# Patient Record
Sex: MALE | URBAN - METROPOLITAN AREA
[De-identification: names, ages, dates, MRNs, and addresses within clinical notes are randomized per-mention and may not be internally consistent; named-entity substitution may affect disease eponyms.]

---

## 2023-06-22 ENCOUNTER — OFFICE VISIT (OUTPATIENT)
Dept: PRIMARY CARE | Facility: CLINIC | Age: 58
End: 2023-06-22
Payer: COMMERCIAL

## 2023-06-22 VITALS
HEART RATE: 66 BPM | DIASTOLIC BLOOD PRESSURE: 72 MMHG | BODY MASS INDEX: 26.44 KG/M2 | HEIGHT: 74 IN | WEIGHT: 206 LBS | SYSTOLIC BLOOD PRESSURE: 118 MMHG | TEMPERATURE: 97.1 F | RESPIRATION RATE: 18 BRPM

## 2023-06-22 DIAGNOSIS — E78.2 MIXED HYPERLIPIDEMIA: Primary | ICD-10-CM

## 2023-06-22 DIAGNOSIS — E11.9 TYPE 2 DIABETES MELLITUS WITHOUT COMPLICATION, UNSPECIFIED WHETHER LONG TERM INSULIN USE (MULTI): ICD-10-CM

## 2023-06-22 DIAGNOSIS — E55.9 VITAMIN D DEFICIENCY: ICD-10-CM

## 2023-06-22 PROBLEM — E78.5 HYPERLIPIDEMIA: Status: ACTIVE | Noted: 2023-06-22

## 2023-06-22 PROBLEM — K64.8 INTERNAL HEMORRHOIDS: Status: ACTIVE | Noted: 2023-06-22

## 2023-06-22 PROCEDURE — 3078F DIAST BP <80 MM HG: CPT | Performed by: FAMILY MEDICINE

## 2023-06-22 PROCEDURE — 3074F SYST BP LT 130 MM HG: CPT | Performed by: FAMILY MEDICINE

## 2023-06-22 PROCEDURE — 1036F TOBACCO NON-USER: CPT | Performed by: FAMILY MEDICINE

## 2023-06-22 PROCEDURE — 99214 OFFICE O/P EST MOD 30 MIN: CPT | Performed by: FAMILY MEDICINE

## 2023-06-22 RX ORDER — ERGOCALCIFEROL 1.25 MG/1
1 CAPSULE ORAL
COMMUNITY
Start: 2019-08-21 | End: 2023-06-22 | Stop reason: SDUPTHER

## 2023-06-22 RX ORDER — LINAGLIPTIN AND METFORMIN HYDROCHLORIDE 2.5; 1 MG/1; MG/1
1 TABLET, FILM COATED ORAL 2 TIMES DAILY
COMMUNITY
End: 2023-06-22 | Stop reason: SDUPTHER

## 2023-06-22 RX ORDER — PRAVASTATIN SODIUM 40 MG/1
40 TABLET ORAL DAILY
Qty: 90 TABLET | Refills: 1 | Status: SHIPPED | OUTPATIENT
Start: 2023-06-22

## 2023-06-22 RX ORDER — PRAVASTATIN SODIUM 40 MG/1
1 TABLET ORAL DAILY
COMMUNITY
End: 2023-06-22 | Stop reason: SDUPTHER

## 2023-06-22 RX ORDER — GLIMEPIRIDE 2 MG/1
TABLET ORAL
Qty: 135 TABLET | Refills: 1 | Status: SHIPPED | OUTPATIENT
Start: 2023-06-22 | End: 2023-09-22

## 2023-06-22 RX ORDER — LINAGLIPTIN AND METFORMIN HYDROCHLORIDE 2.5; 1 MG/1; MG/1
1 TABLET, FILM COATED ORAL 2 TIMES DAILY
Qty: 180 TABLET | Refills: 1 | Status: SHIPPED | OUTPATIENT
Start: 2023-06-22

## 2023-06-22 RX ORDER — GLIMEPIRIDE 2 MG/1
TABLET ORAL 2 TIMES DAILY
COMMUNITY
End: 2023-06-22 | Stop reason: SDUPTHER

## 2023-06-22 RX ORDER — ERGOCALCIFEROL 1.25 MG/1
50000 CAPSULE ORAL
Qty: 15 CAPSULE | Refills: 1 | Status: SHIPPED | OUTPATIENT
Start: 2023-06-22

## 2023-06-22 NOTE — PROGRESS NOTES
"Lam Lucas is a 58 y.o. male here today for   Chief Complaint   Patient presents with    Diabetes    Hyperlipidemia           Diabetes recheck -- Patient feeling well.  No CP, numbness of feet, blurred vision, polyuria.  Trying to follow diet.  No side effects from medications.  No hypoglycemic symptoms.  A1C recently 7.0 - was 7.9 about 6 months ago.  Works with dietician.      HLD recheck -- Patient taking medications correctly.  No SE's of muscle pain or joint pain.  No CP, edema, myalgias.      Vitamin D deficiency -- Patient reports no muscle weakness or pain.  Taking Vitamin D as instructed.  Taking 50,000 weekly.      Occasional feeling of tingling in bottoms of feet when driving.  No loss of sensation noted.       Ran half marathon in [facility] recently.        Current Outpatient Medications:     ergocalciferol (Vitamin D-2) 1.25 MG (03447 UT) capsule, Take 1 capsule (50,000 Units) by mouth 1 (one) time per week., Disp: 15 capsule, Rfl: 1    glimepiride (Amaryl) 2 mg tablet, Take 1.5 tablets twice daily, Disp: 135 tablet, Rfl: 1    Jentadueto 2.5-1,000 mg tablet, Take 1 tablet by mouth 2 times a day., Disp: 180 tablet, Rfl: 1    pravastatin (Pravachol) 40 mg tablet, Take 1 tablet (40 mg) by mouth once daily., Disp: 90 tablet, Rfl: 1    Patient Active Problem List   Diagnosis    Hyperlipidemia    Internal hemorrhoids    Type 2 diabetes mellitus (CMS/HCC)    Vitamin D deficiency         No results found for this or any previous visit (from the past 2016 hour(s)).     Objective    Visit Vitals  /72   Pulse 66   Temp 36.2 °C (97.1 °F)   Resp 18   Ht 1.88 m (6' 2\")   Wt 93.4 kg (206 lb)   BMI 26.45 kg/m²     Body mass index is 26.45 kg/m².     Physical Exam   General - Not in acute distress and cooperative.  Build & Nutrition - Well developed  Posture - Normal  Gait - Normal  Mental Status - alert and oriented x 3    Head - Normocephalic    Neck - Thyroid normal size    Eyes - Bilateral - Sclera clear " and lids pink without edema or mass.      Skin - Warm and dry with no rashes on visible skin    Lungs - Clear to auscultation and normal breathing effort    Cardiovascular - RRR and no murmurs, rubs or thrill.    Peripheral Vascular - Bilateral - no edema present    Neuropsychiatric - normal mood and affect    Feet-monofilament testing for sensation revealed no loss of sensation in any area on either foot.  He has good pulses and no skin issues.  There is no edema.    Assessment    1. Mixed hyperlipidemia  pravastatin (Pravachol) 40 mg tablet, Lipid Panel   Condition well controlled.  No change in current treatment regimen.  Refill given of current medication.  Appropriate labs ordered or reviewed.  Make a follow up appointment with me for recheck in 6 months.     2. Type 2 diabetes mellitus without complication, unspecified whether long term insulin use (CMS/[facility])  glimepiride (Amaryl) 2 mg tablet, Jentadueto 2.5-1,000 mg tablet, Comprehensive Metabolic Panel, CBC, Hemoglobin A1C, CANCELED: POCT glycosylated hemoglobin (Hb A1C) manually resulted   His A1c has improved as above.  Condition well controlled.  No change in current treatment regimen.  Refill given of current medication.  Appropriate labs ordered or reviewed.  Make a follow up appointment with me for recheck in 6 months.     3. Vitamin D deficiency  ergocalciferol (Vitamin D-2) 1.25 MG (22965 UT) capsule, Vitamin D, Total   Condition well controlled.  No change in current treatment regimen.  Refill given of current medication.  Appropriate labs ordered or reviewed.  Make a follow up appointment with me for recheck in 6 months.

## 2023-09-13 LAB — HEMOGLOBIN A1C/HEMOGLOBIN TOTAL IN BLOOD EXTERNAL: 7 %

## 2023-09-13 NOTE — PROGRESS NOTES
Lam Lucas is a 58 y.o. male here today for No chief complaint on file.       HPI         Current Outpatient Medications:     ergocalciferol (Vitamin D-2) 1.25 MG (05944 UT) capsule, Take 1 capsule (50,000 Units) by mouth 1 (one) time per week., Disp: 15 capsule, Rfl: 1    glimepiride (Amaryl) 2 mg tablet, Take 1.5 tablets twice daily, Disp: 135 tablet, Rfl: 1    Jentadueto 2.5-1,000 mg tablet, Take 1 tablet by mouth 2 times a day., Disp: 180 tablet, Rfl: 1    pravastatin (Pravachol) 40 mg tablet, Take 1 tablet (40 mg) by mouth once daily., Disp: 90 tablet, Rfl: 1    Patient Active Problem List   Diagnosis    Hyperlipidemia    Internal hemorrhoids    Type 2 diabetes mellitus (CMS/HCC)    Vitamin D deficiency         No results found for this or any previous visit (from the past 672 hour(s)).     Objective    Visit Vitals  There were no vitals taken for this visit.    There is no height or weight on file to calculate BMI.     Physical Exam       Assessment    No diagnosis found.

## 2023-09-22 DIAGNOSIS — E11.9 TYPE 2 DIABETES MELLITUS WITHOUT COMPLICATION, UNSPECIFIED WHETHER LONG TERM INSULIN USE (MULTI): ICD-10-CM

## 2023-09-22 RX ORDER — GLIMEPIRIDE 2 MG/1
TABLET ORAL
Qty: 270 TABLET | Refills: 0 | Status: SHIPPED | OUTPATIENT
Start: 2023-09-22

## 2023-12-19 ENCOUNTER — APPOINTMENT (OUTPATIENT)
Dept: PRIMARY CARE | Facility: CLINIC | Age: 58
End: 2023-12-19